# Patient Record
Sex: FEMALE | Race: WHITE
[De-identification: names, ages, dates, MRNs, and addresses within clinical notes are randomized per-mention and may not be internally consistent; named-entity substitution may affect disease eponyms.]

---

## 2018-03-29 ENCOUNTER — HOSPITAL ENCOUNTER (OUTPATIENT)
Dept: HOSPITAL 96 - M.INT | Age: 83
Setting detail: OBSERVATION
LOS: 1 days | Discharge: HOME | End: 2018-03-30
Attending: INTERNAL MEDICINE | Admitting: INTERNAL MEDICINE
Payer: COMMERCIAL

## 2018-03-29 VITALS — HEIGHT: 60.98 IN | BODY MASS INDEX: 33.61 KG/M2 | WEIGHT: 178 LBS

## 2018-03-29 VITALS — SYSTOLIC BLOOD PRESSURE: 154 MMHG | DIASTOLIC BLOOD PRESSURE: 52 MMHG

## 2018-03-29 VITALS — DIASTOLIC BLOOD PRESSURE: 54 MMHG | SYSTOLIC BLOOD PRESSURE: 150 MMHG

## 2018-03-29 VITALS — DIASTOLIC BLOOD PRESSURE: 49 MMHG | SYSTOLIC BLOOD PRESSURE: 168 MMHG

## 2018-03-29 DIAGNOSIS — M47.9: ICD-10-CM

## 2018-03-29 DIAGNOSIS — F41.9: ICD-10-CM

## 2018-03-29 DIAGNOSIS — E03.9: ICD-10-CM

## 2018-03-29 DIAGNOSIS — I10: ICD-10-CM

## 2018-03-29 DIAGNOSIS — I70.222: Primary | ICD-10-CM

## 2018-03-29 DIAGNOSIS — E78.5: ICD-10-CM

## 2018-03-29 DIAGNOSIS — F32.9: ICD-10-CM

## 2018-03-29 DIAGNOSIS — E87.1: ICD-10-CM

## 2018-03-29 DIAGNOSIS — I48.0: ICD-10-CM

## 2018-03-29 LAB
ABSOLUTE BASOPHILS: 0.1 THOU/UL (ref 0–0.2)
ABSOLUTE EOSINOPHILS: 0.1 THOU/UL (ref 0–0.7)
ABSOLUTE MONOCYTES: 0.5 THOU/UL (ref 0–1.2)
ALBUMIN SERPL-MCNC: 3.3 G/DL (ref 3.4–5)
ALP SERPL-CCNC: 101 U/L (ref 46–116)
ALT SERPL-CCNC: 16 U/L (ref 30–65)
ANION GAP SERPL CALC-SCNC: 7 MMOL/L (ref 7–16)
APTT BLD: 25.9 SECONDS (ref 25–31.3)
AST SERPL-CCNC: 19 U/L (ref 15–37)
BASOPHILS NFR BLD AUTO: 0.8 %
BILIRUB SERPL-MCNC: 0.4 MG/DL
BUN SERPL-MCNC: 38 MG/DL (ref 7–18)
CALCIUM SERPL-MCNC: 8.7 MG/DL (ref 8.5–10.1)
CHLORIDE SERPL-SCNC: 91 MMOL/L (ref 98–107)
CO2 SERPL-SCNC: 27 MMOL/L (ref 21–32)
CREAT SERPL-MCNC: 1.5 MG/DL (ref 0.6–1.3)
EOSINOPHIL NFR BLD: 1.9 %
GLUCOSE SERPL-MCNC: 97 MG/DL (ref 70–99)
GRANULOCYTES NFR BLD MANUAL: 77 %
HCT VFR BLD CALC: 36.6 % (ref 37–47)
HGB BLD-MCNC: 12.6 GM/DL (ref 12–15)
INR PPP: 1
LYMPHOCYTES # BLD: 0.9 THOU/UL (ref 0.8–5.3)
LYMPHOCYTES NFR BLD AUTO: 12.7 %
MAGNESIUM SERPL-MCNC: 2.2 MG/DL (ref 1.8–2.4)
MCH RBC QN AUTO: 33.3 PG (ref 26–34)
MCHC RBC AUTO-ENTMCNC: 34.4 G/DL (ref 28–37)
MCV RBC: 96.9 FL (ref 80–100)
MONOCYTES NFR BLD: 7.6 %
MPV: 7.8 FL. (ref 7.2–11.1)
NEUTROPHILS # BLD: 5.2 THOU/UL (ref 1.6–8.1)
NUCLEATED RBCS: 0 /100WBC
PLATELET COUNT*: 203 THOU/UL (ref 150–400)
POTASSIUM SERPL-SCNC: 4.8 MMOL/L (ref 3.5–5.1)
PROT SERPL-MCNC: 7.2 G/DL (ref 6.4–8.2)
PROTHROMBIN TIME: 10.1 SECONDS (ref 9.2–11.5)
RBC # BLD AUTO: 3.78 MIL/UL (ref 4.2–5)
RDW-CV: 15.1 % (ref 10.5–14.5)
SODIUM SERPL-SCNC: 125 MMOL/L (ref 136–145)
WBC # BLD AUTO: 6.8 THOU/UL (ref 4–11)

## 2018-03-29 NOTE — NUR
PATIENT REMAINS ALERT AND ORIENTED X4. VSS ON ROOM AIR. PATIENTS PAIN IS BEING
MANAGED WITH PAIN MEDICATION AND LIDOCAINE GEL APPLIED TO LEFT FOOT. RESTING
COMFORTABLY IN BED AT THIS TIME. HOURLY ROUNDS. CALL LIGHT WITHIN REACH.
NURSING WILL CONTINUE TO MONITOR.

## 2018-03-29 NOTE — NUR
ASSUMED CARE OF PATIENT AFTER DIRECT ADMIT AT 1015. ADMIN HISTORY AND
ASSESSMENT COMPLETED AND AS CHARTED. VSS ON ROOM AIR. PATIENT HAS COMPLAINT OF
PAIN IN BILATERAL FEET THAT HAS BEEN BOTHERING HER FOR OVER A YEAR NOW.
PATIENT IS SCHEDULED FOR AN ANGIOGRAM TOMORROW MORNING AND WAS ADMITTED
BECAUSE SHE DID NOT FEEL COMFORTABLE DRIVING IN THE DARK IN THE MORNING.
PATIENT ORIENTED TO ROOM. DR PINEDA CAME AND SPOKE TO THE PATIENT ANDPUT
IN ORDERS FOR HOME MEDS AND PAIN MEDS. PATIENT STATED THAT SHE DOES NOT RECALL
WHAT MEDS SHE TOOK THIS MORNING BUT KNOWS SHE DID NOT TAKE TRAMADOL. I HELD
ALL ORDERED MEDS SCHEDULED FOR 0900 FOR PATIENT SAFETY. TRAMADOL WAS GICEN FOR
PAIN. PATIENT IS RESTING COMFORTABLY IN BED AT THIS TIME. CALL LIGHT IS WITHIN
REACH. NURSING WILL CONTINUE TO MONITOR.

## 2018-03-30 VITALS — DIASTOLIC BLOOD PRESSURE: 72 MMHG | SYSTOLIC BLOOD PRESSURE: 159 MMHG

## 2018-03-30 VITALS — SYSTOLIC BLOOD PRESSURE: 133 MMHG | DIASTOLIC BLOOD PRESSURE: 60 MMHG

## 2018-03-30 VITALS — SYSTOLIC BLOOD PRESSURE: 128 MMHG | DIASTOLIC BLOOD PRESSURE: 62 MMHG

## 2018-03-30 VITALS — SYSTOLIC BLOOD PRESSURE: 159 MMHG | DIASTOLIC BLOOD PRESSURE: 72 MMHG

## 2018-03-30 VITALS — SYSTOLIC BLOOD PRESSURE: 168 MMHG | DIASTOLIC BLOOD PRESSURE: 49 MMHG

## 2018-03-30 VITALS — DIASTOLIC BLOOD PRESSURE: 88 MMHG | SYSTOLIC BLOOD PRESSURE: 124 MMHG

## 2018-03-30 VITALS — SYSTOLIC BLOOD PRESSURE: 136 MMHG | DIASTOLIC BLOOD PRESSURE: 59 MMHG

## 2018-03-30 LAB
ANION GAP SERPL CALC-SCNC: 11 MMOL/L (ref 7–16)
BUN SERPL-MCNC: 31 MG/DL (ref 7–18)
CALCIUM SERPL-MCNC: 9.2 MG/DL (ref 8.5–10.1)
CHLORIDE SERPL-SCNC: 95 MMOL/L (ref 98–107)
CO2 SERPL-SCNC: 23 MMOL/L (ref 21–32)
CREAT SERPL-MCNC: 1.2 MG/DL (ref 0.6–1.3)
GLUCOSE SERPL-MCNC: 95 MG/DL (ref 70–99)
POTASSIUM SERPL-SCNC: 5.1 MMOL/L (ref 3.5–5.1)
SODIUM SERPL-SCNC: 129 MMOL/L (ref 136–145)

## 2018-03-30 NOTE — NUR
Direct admit yesterday because she had a procedure today. She is alert and
oriented x 4. She has pain in her feet especially her left foot. Pedal pulses
+2. She is stand by assist to the bedside commode. She moves slowly. She is
voiding well. She had pain meds x 2. She's had nothing by mouth since
midnight. She has slept intermittenly.

## 2018-03-30 NOTE — NUR
ASSUMED PT CARES AT 0700. PT LEFT VIA BED TO PACU AT APPROX. 0730. PT RETURNED
TO UNIT AT 1020. PT IN BED, BED IN LOW LOCKED POSITION. CALL BUTTON AND
PERSONAL ITEMS PLACED IN REACH. PT A&O X4, HR IRREGULAR, AFIB NOTED,
TACHYCARDIC PER AUSCULTATION. HOME MEDS TO BE GIVEN UPON ADMIT AS OBSERVATION.
LCTAB, ABD SOFT, NON TENDER TO PALPATE. SKIN INTACT WITH RIGHT GROIN CATH
SITE, GAUZE, TRANSPARENT BANDAGE, C/D/I. AREA AROUND CATH SITE SOFT TO
PALPATION, NO BRUISING AT THIS TIME. RIGHT FA 20 GAUGE IV INFUSING FLUIDS. VSS
ON 2L NC O2. PEDAL AND RADIAL PULSES WNL +2, CAP REFILL <3 SEC. PT PROGRESSING
TOWARDS GOAL. DISCHARGE PENDING. PT UP SBA TO BSC. PT USED BED PAN FOR
URINATION POST PROCEDURE. WILL CONTINUE TO MONITOR PT PROGRESS.

## 2018-03-30 NOTE — NUR
PT CLEARED FOR DISCHARGE. IV REMOVED. VSS ON RA. PT REMAINS STABLE. TALKATIVE,
SMILING. DISCHARGE EDUCATION COMPLETED. DR. MONTERROSO APPT CONFIRMED. ALL
DISCHARGE DOCUMENTS SIGNED. PT ESCORTED VIA W/C BY NURSING STAFF WITH DAUGHTER
TO CAR TO GO TO DAUGHTERS HOME FOR RECOVERY. DISCHARGE SUCCESSFUL/COMPLETE AT
1550. STROKE SIGNS/SYMPTOMS AND RISK FACTORS EDUCATED AND REVIEWED. QUESTIONS
ANSWERED.

## 2018-04-02 NOTE — OP
[address]  [address]                    OPERATIVE REPORT              
_______________________________________________________________________________
 
Name:       ROMAN LIGHT             Room:           [address] Kartik SHARP#:  [id number]      Account #:      [id number]  
Admission:  03/30/18     Attend Phys:    Ramon Finley MD 
Discharge:  03/30/18     Date of Birth:  [birth year]  
         Report #: [id number]
                                                                     [id number]  
_______________________________________________________________________________
THIS REPORT FOR:  //name//                      
 
CC: Jordon BAIRES
 
DATE OF SERVICE:  03/30/2018
 
 
PREOPERATIVE DIAGNOSIS:  Severe peripheral vascular disease with left lower
extremity rest pain.
 
POSTOPERATIVE DIAGNOSIS:  Severe peripheral vascular disease with left lower
extremity rest pain.
 
OPERATIONS:
1.  Ultrasound-guided access to the right common femoral artery.
2.  Abdominal aortogram.
3.  Third order selective left lower extremity angiogram.
4.  Left popliteal artery angioplasty with a Lutonix drug-coated balloon.
5.  Left anterior tibial and posterior tibial artery angioplasty.
6.  Right lower extremity angiogram from the right femoral sheath.
7.  Mynx closure device, right common femoral artery.
 
SURGEON:  Franco Davis DO.
 
ASSISTANT:  STEFFANY Alas.
 
ANESTHESIA:  Moderate sedation by the anesthesia team.
 
ESTIMATED BLOOD LOSS:  25 mL.
 
FLUIDS:  300 crystalloid.
 
URINE OUTPUT:  None.
 
SPECIMENS:  None.
 
COMPLICATIONS:  None.
 
IMPLANTS:  None.
 
FINDINGS:  Ultrasound demonstrated the right common femoral artery to be soft
and compressible, suitable for access.  Initial aortogram demonstrated patent
bilateral renal arteries in the infrarenal aorta and bilateral common internal
 
 
 
[facility] 
[address] [place]  [address]                    OPERATIVE REPORT              
_______________________________________________________________________________
 
Name:       ROMAN LIGHT             Room:           [address] Kartik SHARP#:  [id number]      Account #:      [id number]  
Admission:  03/30/18     Attend Phys:    Ramon Finley MD 
Discharge:  03/30/18     Date of Birth:  [birth year]  
         Report #: [id number]
                                                                     [id number]  
_______________________________________________________________________________
and external iliac arteries, without significant stenosis and patent left common
femoral artery.  The deep femoral artery main trunk appeared to be occluded. 
There were two additional smaller branches that were patent without stenosis. 
Left superficial femoral artery was patent, with some very mild atherosclerotic
disease.  She had a left popliteal artery occlusion of about 5 cm segment at the
adductor hiatus.  She had reconstitution at the popliteal artery.  She had
occlusion of her proximal anterior tibial artery with reconstitution just in the
distal to proximal third.  She had occlusion of her tibioperoneal trunk and
proximal posterior tibial and peroneal arteries with reconstitution.  The
vessels appear to be relatively normal once they reconstituted down to the
ankle.  Following popliteal artery angioplasty and tibial artery angioplasty,
there was an excellent result with return of inline flow to the 3-vessel runoff
into her foot.  The selective right lower extremity angiogram demonstrated the
right common femoral, deep femoral and superficial femoral arteries to be
patent.  She had a focal right popliteal occlusion at the knee.  This
reconstituted just distally with what appeared to be 3-vessel runoff into the
foot as well.
 
CLINICAL HISTORY:  The patient is an 86-year-old woman with known peripheral
vascular disease.  She previously underwent a left lower extremity angiogram and
was found to have popliteal artery occlusion several months ago.  She has
recurrent lower extremity pain.  She has also had a significant drop in her
right lower extremity JOHN from 0.9 to 0.4.  Her left JOHN was 0.4 as well.  She
had pain symptoms consistent with rest pain.  She was recommended for angiogram
again and intervention.
 
DESCRIPTION OF PROCEDURE:  After informed consent was obtained, the patient was
taken to the angio suite and placed on the angio bed in supine position.  She
was administered sedation by the anesthesia team.  Bilateral groins were prepped
and draped in the usual sterile fashion.  Full time-out was performed
identifying correct patient and procedure.
 
Using ultrasound guidance, the right common femoral artery was identified.  Skin
and subcutaneous tissues were anesthetized with lidocaine anesthetic.  Using
micropuncture technique, the common femoral artery was accessed.  These
ultrasound images were preserved.  Using Seldinger technique, microwire and
microsheath were placed and ultimately upsized to a 6-Australian sheath over a
UMicItson wire.  At this point, I then advanced the flush catheter over the wire
to the infrarenal aorta and performed the aortoiliofemoral angiogram, with the
findings noted above.  I then obtained up and over accessed past the catheter
over the wire down to the left common femoral artery.  I performed the selective
left lower extremity angiogram with the findings noted above.  I then exchanged
out for a 6-Australian up and over sheath and a seeker catheter.  I was able to
navigate across the popliteal artery occlusion.  I positioned the catheter
across the anterior tibial artery occlusion while in the distal anterior tibial
artery.  Followup imaging confirmed intraluminal position [place] [address]                    OPERATIVE REPORT              
_______________________________________________________________________________
 
Name:       ROMAN LIGHT             Room:           [address] Kartik SHARP#:  [id number]      Account #:      [id number]  
Admission:  03/30/18     Attend Phys:    Ramon Finley MD 
Discharge:  03/30/18     Date of Birth:  [birth year]  
         Report #: [id number]
                                                                     [id number]  
_______________________________________________________________________________
tibial artery.  At this point, I then angioplastied the popliteal artery
occlusion with a 4-mm balloon with a decent result.  I then angioplastied the
proximal anterior tibial artery with a 3-mm balloon with excellent result.  At
this point, I then navigated the wire into the posterior tibial artery.  I again
angioplastied the posterior tibial artery and tibioperoneal trunk occlusion with
a 3-mm balloon with an excellent result.  Imaging of the popliteal demonstrated
some moderate residual stenosis and so this was angioplastied with a 5-mm
Lutonix drug-coated balloon with an excellent result.  There was really no
residual stenosis.  She had excellent inline flow through a 3-vessel runoff into
her foot.  At this point, the wire and the sheath were backed in the right
external iliac artery.  I performed then right lower extremity runoff angiogram
via the sheath, with the findings noted above.  Again, she had a focal popliteal
artery occlusion there.  Otherwise, vessels appeared to be relatively normal. 
At this point, I then exchanged out for the short 6-Australian sheath, deployed a
6-Australian Mynx closure device in a standard fashion and partially reversed the
heparin with 30 mg of protamine.  Manual pressure was held for 10 minutes.  Once
hemostasis was ensured, sterile dressing was applied.  All sponge, sharp and
instrument counts reported as correct x 2.  She tolerated the procedure well and
was transferred to recovery area in stable condition.
 
 
 
 
 
 
 
 
 
 
 
 
 
 
 
 
 
 
 
 
 
 
 
 
 
<ELECTRONICALLY SIGNED>
                                        By:  Franco Davis DO          
04/02/18     1639
D: 03/30/18 0914_______________________________________
T: 03/30/18 1211Amonika Davis DO             /nt